# Patient Record
Sex: FEMALE | Race: WHITE | URBAN - METROPOLITAN AREA
[De-identification: names, ages, dates, MRNs, and addresses within clinical notes are randomized per-mention and may not be internally consistent; named-entity substitution may affect disease eponyms.]

---

## 2017-01-25 ENCOUNTER — TELEPHONE (OUTPATIENT)
Dept: PSYCHIATRY | Facility: CLINIC | Age: 57
End: 2017-01-25

## 2017-01-25 DIAGNOSIS — F41.9 ANXIETY: Primary | ICD-10-CM

## 2017-01-25 DIAGNOSIS — F43.10 PTSD (POST-TRAUMATIC STRESS DISORDER): ICD-10-CM

## 2017-01-25 RX ORDER — CLONAZEPAM 1 MG/1
0.5-1 TABLET ORAL 2 TIMES DAILY PRN
Qty: 20 TABLET | Refills: 0 | COMMUNITY
Start: 2017-01-25

## 2017-01-25 NOTE — TELEPHONE ENCOUNTER
Thank you. Needs a follow up with me. I will fill 20 more tablets only. No fills. She should be tapering off of this medication.

## 2017-01-25 NOTE — TELEPHONE ENCOUNTER
I called Karen to tell her QTY 20 will be called in, no refills. Also left a message that she needs a follow up appointment and to call to schedule.   RX called into [facility] [place], [place]

## 2017-01-25 NOTE — TELEPHONE ENCOUNTER
Refill request received for Clonazepam 1 mg tablets QTY 20.    Last office visit to Anabelle Houston, PhD, APRN, CNP was 2016.  Per 16 treatment plan, she was due back in a month or so.   No future appointments scheduled with her at this time.     Will route to provider for decision.      [facility] Date: 17  [employer] Query Report Page#: 1  Patient Rx History Report  [facility] [facility]  Search Criteria: Last Name 'rico' and First Name 'humble' and  =  and Request Period =  to  ' - 3 out of 3 Recipients Selected.  Fill Date Product, Str, Form Qty Days Pt ID Prescriber Written RX# N/R* Pharm MED+  ---------- -------------------------------- ------ ---- --------- ---------- ---------- ------------ ----- --------- ------  2016 CLONAZEPAM 1 MG TABLET 20.00 10 [id number] 2016 [id number] R [id number] 00.0  2016 CLONAZEPAM 1 MG TABLET 20.00 10 [id number] 2016 [id number] N [id number] 00.0  2016 GABAPENTIN 300 MG CAPSULE 270.00 30 [id number] 2016 [id number] R [id number] 00.0  2016 ALPRAZOLAM 1 MG TABLET 40.00 10 [id number] 2016 [id number] N [id number] 00.0  2016 ALPRAZOLAM 1 MG TABLET 70.00 18 [id number] 2016 [id number] N [id number] 00.0  2016 ALPRAZOLAM 1 MG TABLET 80.00 20 [id number] 2016 [id number] N [id number] 00.0  2016 HYDROCODON-ACETAMINOPH 7.5-325 90.00 30 [id number] 2016 [id number] N [id number] 22.5  2016 ALPRAZOLAM 1 MG TABLET 90.00 22 [id number] 2016 [id number]  [id number] 00.0  2016 ALPRAZOLAM 1 MG TABLET 100.00 25 [id number] 2016 [id number] N [id number] 00.0  2016 HYDROCODON-ACETAMINOPH 7.5-325 120.00 30 [id number] 2016 [id number]  [id number] 30.0  *N/R N=New R=Refill  +MED Daily  Prescribers for prescriptions  listed  ----------------------------------------------------------------------------------------------------------------------------------  [id number] AUDREY CURRY (ELDA); [facility], [address] [facility],, [facility] [address]  [id number] SLAVA CARTER NP; [facility] PA[facility], [address] [facility] [address]  [id number] AJ TINOCO MD; [facility], [address] [facility], [facility] [address]  [id number] SLAVA CABELLO (NP); [address] [facility], [address], [facility] [address]  Pharmacies that dispensed prescriptions listed  ----------------------------------------------------------------------------------------------------------------------------------  [id number] [facility] [place]; [address] [facility] [address],  [id number] XTRM; : [id number], [address] [facility] [address],  Patients that match search criteria  ----------------------------------------------------------------------------------------------------------------------------------  [id number] [place] [facility], [facility]; [address] [id number]  [id number] [facility] [address] [id number]  [id number] [place] [facility], [facility]; [address] [place] [address]  MED Summary  This section displays cumulative MED values by unique recipient. The MED Max value is the maximum occurrence of cumulative MED  sustained for any 3 consecutive days. This value is calculated based on prescriptions dispensed during the date range requested.  -----------------------------------------------------------------------------------------------------------------------------------  30 MILTON GÓMEZ; 1960; [address] [place] [address]  0 MILTON GÓMEZ; 1960; [address] [facility] [address]  Report Disclaimers:  The report provided above is based upon the search criteria and the data provided by the dispensing  entities. For more information  about any prescription, please contact the dispenser or the prescriber.  This report contains confidential information, including patient identifiers, and is not a public record. The information on this  report must be treated as protected health information and is to be disclosed to others only as authorized by applicable state  and Federal regulations.

## 2017-02-24 NOTE — LETTER
[address] [address]  Phone: [phone]   March 28, 2017    Karen Cantu  [address] [place] [address]      Dear Karen,    My staff have been attempting to reach you in regards to a recent refill request for: Gabapenten 300MG  After reviewing your chart what do you take this medication for and how often do you take it? Also, who is the original prescriber on this?     We also got a refill request for: hydrochlorothiazide (MICROZIDE) 12.5 MG capsule.   Do you take this medication in addition to your lisinopril for hypertension? Also, who is the original prescriber on this? The only medication we have in your chart is lisinopril.   Please contact my office to discuss further    Thank you for your time           Sincerely,        Bridgette Ernandez PA-C

## 2017-02-24 NOTE — TELEPHONE ENCOUNTER
Routing refill request to provider for review/approval because:  Drug not on the [facility] refill protocol     Chelsea Bond RN, BSN  [facility]

## 2017-02-24 NOTE — TELEPHONE ENCOUNTER
Controlled Substance Refill Request for GABAPENTIN 300MG CAPSULES  Problem List Complete:  Yes    Last Written Prescription Date:  05/12/2016  Last Fill Quantity: 270,   # refills: 1    Last Office Visit with G primary care provider: 08/30/2016    Clinic visit frequency required: not noted     Future Office visit:     Controlled substance agreement on file: No.     Processing:  Fax Rx to [facility] pharmacy   checked in past 6 months?  No, route to RN

## 2017-03-09 DIAGNOSIS — I10 HYPERTENSION, GOAL BELOW 140/90: ICD-10-CM

## 2017-03-09 NOTE — TELEPHONE ENCOUNTER
Routing refill request to provider for review/approval because:  Drug not on the [facility] refill protocol     Chelsea Bond RN, BSN  [facility]

## 2017-03-09 NOTE — TELEPHONE ENCOUNTER
Gabapenten 300MG      Last Written Prescription Date:  5/12/16  Last Fill Quantity: 270,   # refills: 1  Last Office Visit [facility] or  Health prescribing provider: 8/30/16  Future Office visit:       Routing refill request to provider for review/approval because:  Drug not on the [facility] or  Health refill protocol or controlled substance

## 2017-03-15 NOTE — TELEPHONE ENCOUNTER
Called Karen to inquire about this request.  She did not answer, voicemail left for her to call back.    This was medication was recorded as historical.       Please ask patient if she takes this medication in addition to her lisinopril for hypertension.  Also, who is the original prescriber on this?  The only medication I have in her chart is lisinopril.

## 2017-03-15 NOTE — TELEPHONE ENCOUNTER
Called Karen to inquire about this request.  She did not answer, voicemail left for her to call back.  This was not addressed at her physical in August and therefore I am not sure what she takes this for.      Please ask patient what she takes this medication for and how she takes it.  Also, who is the original prescriber on this?

## 2017-03-16 NOTE — TELEPHONE ENCOUNTER
Attempt #2.    Jacey Vigil contacted Karen on 03/16/17 and left a message. If patient calls back please contact RN team.  Blanca Vigil RN

## 2017-03-28 NOTE — TELEPHONE ENCOUNTER
Attempt # 3    Called # [phone]    Left  Rob detailed VM     Letter sent -     Unable to refill or get a hold of pt - please advise on refills    Chelsea Bond RN, BSN  [facility]

## 2017-03-28 NOTE — TELEPHONE ENCOUNTER
Attempt # 3    Called # [phone]    Left  Rob detailed VM     Letter sent -     Unable to refill or get a hold of pt - please advise on refills    Chelsea Bond RN, BSN  [facility]

## 2017-04-07 RX ORDER — GABAPENTIN 300 MG/1
CAPSULE ORAL
Qty: 270 CAPSULE | Refills: 0 | OUTPATIENT
Start: 2017-04-07

## 2017-04-07 RX ORDER — HYDROCHLOROTHIAZIDE 12.5 MG/1
CAPSULE ORAL
Qty: 30 CAPSULE | Status: CANCELLED | OUTPATIENT
Start: 2017-04-07

## 2017-04-07 NOTE — TELEPHONE ENCOUNTER
Will not refill medication if we cannot get a hold of patient.      We are awaiting return from patient for further medication refills.

## 2017-09-26 DIAGNOSIS — E11.9 TYPE 2 DIABETES MELLITUS WITHOUT COMPLICATION (H): ICD-10-CM

## 2017-09-27 NOTE — TELEPHONE ENCOUNTER
blood glucose monitoring (FREESTYLE LITE) test strip      Last Written Prescription Date: 11/18/2015  Last Fill Quantity: 2 Box,  # refills: 6   [place] [facility] [place] [facility] or  Health prescribing provider: 8/30/2016                                               blood glucose monitoring (FREESTYLE) lancets      Last Written Prescription Date: 11/18/2015  Last Fill Quantity: 1 Box,  # refills: prn   Last Office Visit with [facility] or [facility] prescribing provider: 8/30/2016

## 2017-09-28 RX ORDER — LANCETS 28 GAUGE
EACH MISCELLANEOUS
Qty: 100 EACH | Refills: 0 | Status: SHIPPED | OUTPATIENT
Start: 2017-09-28

## 2017-09-28 RX ORDER — BLOOD-GLUCOSE METER
KIT MISCELLANEOUS
Qty: 200 STRIP | Refills: 0 | Status: SHIPPED | OUTPATIENT
Start: 2017-09-28

## 2017-09-28 NOTE — TELEPHONE ENCOUNTER
Medication is being filled for 1 time refill only due to:  Patient needs to be seen because it has been more than one year since last visit.   Blanca Vigil RN  [facility]

## 2017-09-30 ENCOUNTER — TELEPHONE (OUTPATIENT)
Dept: FAMILY MEDICINE | Facility: CLINIC | Age: 57
End: 2017-09-30

## 2017-09-30 DIAGNOSIS — E11.9 TYPE 2 DIABETES, HBA1C GOAL < 7% (H): Primary | ICD-10-CM

## 2017-09-30 NOTE — TELEPHONE ENCOUNTER
Form received from [facility] for request for new freestyle meter.  Supplies were reordered but patient reported her meter broke.  Please order new Freestyle meter and send to [facility] in [place]

## 2017-10-02 NOTE — TELEPHONE ENCOUNTER
Need to know which Freestyle meter she needs. There are 3 different meters to choose from in Epic.    Attempt # 1 to [phone].    Left non-detailed VM for patient to call back.    MITCH Baxter, RN, PHN  [facility]      MITCH Baxter, RN, PHN  [facility]) [phone]

## 2017-10-03 RX ORDER — BLOOD-GLUCOSE METER
KIT MISCELLANEOUS
Qty: 1 KIT | Refills: 0 | Status: SHIPPED | OUTPATIENT
Start: 2017-10-03

## 2017-10-03 NOTE — TELEPHONE ENCOUNTER
New meter order sent to pharmacy.    Amna Roca, BS, RN, N  [facility]) [phone]

## 2017-10-03 NOTE — TELEPHONE ENCOUNTER
Attempt # 2    Left non-detailed VM for patient to call back.    Amna Roca, MITCH, RN, PHN  [facility]

## 2017-10-04 ENCOUNTER — TELEPHONE (OUTPATIENT)
Dept: FAMILY MEDICINE | Facility: CLINIC | Age: 57
End: 2017-10-04

## 2017-10-04 NOTE — TELEPHONE ENCOUNTER
Completed forms faxed back to [facility] at [phone].   Originals sent to be scanned.   Left non-detailed message for patient to call back.  (see message below)    Debby Valenzuela

## 2017-10-04 NOTE — TELEPHONE ENCOUNTER
Date Forms was received: October 4, 2017    Forms received by: Fax    Purpose of Form:  Diabetes form    How the form needs to be returned for patient:  Fax    Form currently placed  [place]

## 2017-10-10 ENCOUNTER — TELEPHONE (OUTPATIENT)
Dept: FAMILY MEDICINE | Facility: CLINIC | Age: 57
End: 2017-10-10

## 2017-10-10 NOTE — TELEPHONE ENCOUNTER
Completed forms faxed back to [facility] at [phone].   Originals sent to be scanned.     Debby Valenzuela

## 2017-10-17 ENCOUNTER — TELEPHONE (OUTPATIENT)
Dept: FAMILY MEDICINE | Facility: CLINIC | Age: 57
End: 2017-10-17

## 2017-10-17 NOTE — TELEPHONE ENCOUNTER
Date Forms was received: October 17, 2017    Forms received by: Fax    Last office visit: 08/30/2016    Purpose of Form:  [place] - diabetic forms    When the form is due:  asap    How the form needs to be returned for patient:  Fax - [phone]    Form currently placed  Nahun Singh

## 2017-10-18 NOTE — TELEPHONE ENCOUNTER
Can you please call this patient to inquire about the diabetic forms we received on 10.17.2017.  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  Also, she is overdue for a diabetic followup appointment and should be seen in clinic for evaluation.      Thank you.

## 2017-12-24 ENCOUNTER — HEALTH MAINTENANCE LETTER (OUTPATIENT)
Age: 57
End: 2017-12-24